# Patient Record
Sex: MALE | Race: OTHER | Employment: FULL TIME | URBAN - METROPOLITAN AREA
[De-identification: names, ages, dates, MRNs, and addresses within clinical notes are randomized per-mention and may not be internally consistent; named-entity substitution may affect disease eponyms.]

---

## 2019-03-28 ENCOUNTER — APPOINTMENT (OUTPATIENT)
Dept: CT IMAGING | Facility: HOSPITAL | Age: 49
DRG: 439 | End: 2019-03-28
Attending: EMERGENCY MEDICINE

## 2019-03-28 PROBLEM — E78.1 HYPERTRIGLYCERIDEMIA: Status: ACTIVE | Noted: 2019-03-28

## 2019-03-28 PROBLEM — F10.230 ALCOHOL WITHDRAWAL SYNDROME WITHOUT COMPLICATION (HCC): Status: ACTIVE | Noted: 2019-03-28

## 2019-03-28 PROBLEM — K85.20 ALCOHOL-INDUCED ACUTE PANCREATITIS, UNSPECIFIED COMPLICATION STATUS: Status: ACTIVE | Noted: 2019-03-28

## 2019-03-28 PROBLEM — E87.1 HYPONATREMIA: Status: ACTIVE | Noted: 2019-03-28

## 2019-03-28 PROBLEM — E83.42 HYPOMAGNESEMIA: Status: ACTIVE | Noted: 2019-03-28

## 2019-03-28 PROCEDURE — 74177 CT ABD & PELVIS W/CONTRAST: CPT | Performed by: EMERGENCY MEDICINE

## 2019-03-28 NOTE — ED NOTES
States pain started 2 days ago and came on real strong last night. States pain is all over his abdomen.

## 2019-03-28 NOTE — ED INITIAL ASSESSMENT (HPI)
Sent from [address] [place] [address] for eval of 3 days of lower abdominal pain with vomiting.  Took pepto with no relief    Dry mouth

## 2019-03-28 NOTE — ED PROVIDER NOTES
Patient Seen in: [facility] AND [facility] Emergency Department    History   Patient presents with:  Abdominal Pain    Stated Complaint: abd pain     HPI    52 yo M without PMH/PSH presenting for evaluation of two days of intermittent abdominal pain.  No nausea/vom Alert. Mild tremor noted. Skin: Skin is warm. Psychiatric: Cooperative. Nursing note and vitals reviewed.         ED Course     Labs Reviewed   URINALYSIS WITH CULTURE REFLEX - Abnormal; Notable for the following components:       Result Value    Protei DIFFERENTIAL WITH PLATELET    Narrative: The following orders were created for panel order CBC WITH DIFFERENTIAL WITH PLATELET.   Procedure                               Abnormality         Status                     --------- distal stomach and duodenum with surrounding inflammation. Segments of the colon are incompletely distended and suboptimally evaluated. A normal caliber appendix is seen without inflammatory manifestations.   URINARY BLADDER: No visible calculus or focal w MDM     DIFFERENTIAL DIAGNOSIS: After history and physical exam differential diagnosis includes but is not limited to alcohol withdrawal, alcoholic gastritis/pancreatitis/hepatitis, cirrhosis, electrolyte derangement.     Pulse ox: 98%:Normal on RA, as patient.

## 2019-03-29 ENCOUNTER — APPOINTMENT (OUTPATIENT)
Dept: ULTRASOUND IMAGING | Facility: HOSPITAL | Age: 49
DRG: 439 | End: 2019-03-29
Attending: INTERNAL MEDICINE

## 2019-03-29 PROCEDURE — 76705 ECHO EXAM OF ABDOMEN: CPT | Performed by: INTERNAL MEDICINE

## 2019-03-29 NOTE — H&P
[facility]    History and Physical    Idella Seats Patient Status:  Inpatient    1970 MRN [id number]   Location [facility] 2W/SW Attending Nataliia Rizo MD   [facility] Day # 1 PCP None Pcp     Date:  3/29/2019  Date of Admiss Regular rhythm. Tachycardia present. Pulmonary/Chest: Effort normal and breath sounds normal.   Abdominal: Soft. Bowel sounds are normal. There is tenderness in the epigastric area. Musculoskeletal: Normal range of motion.    Neurological: He is alert potential cystitis. 7. Lesser incidental findings as above.    Dictated by (CST): Rupal Simpson MD on 3/28/2019 at 21:21     Approved by (CST): Rupal Simpson MD on 3/28/2019 at 21:29                Assessment/Plan:     Alcohol withdrawal syndrome withou

## 2019-03-29 NOTE — BH PROGRESS NOTE
Behavioral Health Note  CHIEF COMPLAINT  Alcohol withdrawal  REASON FOR ADMISSION  Alcohol withdrawal    SOCIAL HISTORY  Kelly Valencia lives with his landlord and family. He does not smoke or use drugs and drinks a pint 3x weekly.    PAST PSYCHIATRIC HISTORY  Car

## 2019-03-29 NOTE — CONSULTS
[facility]    Report of Consultation    Yanely Dequanjoy Patient Status:  Inpatient    1970 MRN [id number]   Location [facility] 2W/SW Attending Hernando Mccrary MD   [facility] Day # 1 PCP None Pcp     Date of Admission:  3/28/201 packet, 1 packet, Oral, TID CC  •  dextrose 10 % infusion, , Intravenous, Continuous  •  calcium gluconate 2 g in sodium chloride 0.9% 100 mL IVPB, 2 g, Intravenous, Once  •  dextrose 5 %-0.45 % NaCl infusion, 100 mL/hr, Intravenous, Continuous PRN  •  Ins

## 2019-03-29 NOTE — ED NOTES
Lab waiting for bmp and hfp to be entirely completed before resulting, verbal results for BUN  - 16, Cre 1.24 per [employer] tech [address] [facility]

## 2019-03-29 NOTE — CONSULTS
[facility]    Report of Consultation    Nicho Gertrude Patient Status:  Inpatient    1970 MRN [id number]   Location [facility] 2W/SW Attending Terry Parker MD   [address] Day # 1 PCP None Pcp     Date of Admission:  3/28/2019 LORazepam (ATIVAN) injection 3 mg 3 mg Intravenous Q15 Min PRN   LORazepam (ATIVAN) injection 4 mg 4 mg Intravenous Q10 Min PRN   ondansetron HCl (ZOFRAN) injection 4 mg 4 mg Intravenous Q6H PRN   ibuprofen (MOTRIN) tab 600 mg 600 mg Oral Q6H PRN   Insul Value Date    WBC 8.3 03/28/2019    HGB 14.8 03/28/2019    HCT 40.9 03/28/2019    .0 (L) 03/28/2019    CREATSERUM 1.24 03/28/2019    BUN 16 03/28/2019     (L) 03/28/2019    K 3.0 (L) 03/28/2019    CL 86 (L) 03/28/2019    CO2 24.0 03/28/2019 bowel obstruction. Fairly extensive wall thickening involving distal stomach and duodenum with surrounding inflammation. Segments of the colon are incompletely distended and suboptimally evaluated.  A normal caliber appendix is seen without inflammatory m Approved by (CST): Rupal Simpson MD on 3/28/2019 at 21:29             Impression:   Mr Victor M Forrest is a 52 y/o male with hx of ETOH abuse that was admitted with pancreatitis.  Noted markedly elevated TG as well (3950) Mild lactic acidosis resolved otherwise n

## 2019-03-29 NOTE — PLAN OF CARE
Patient Centered Care    • Patient preferences are identified and integrated in the patient's plan of care Progressing        Patient/Family Goals    • Patient/Family Long Term Goal Progressing    • Patient/Family Short Term Goal Progressing            Pt

## 2019-03-30 NOTE — PROGRESS NOTES
[facility] AND [facility]  Gastroenterology Progress Note    Jessica Evans Patient Status:  Inpatient    1970 MRN [id number]   Location [facility] 2W/SW Attending Asia Luna MD   [facility] Day # 2 PCP None Pcp     Subjective:  Jessica Evans is a( likely reactive to pancreatitis. 4. Severe fatty liver. 5. Mild T12 vertebral body compression deformity with nonacute imaging features. 6. Circumferential urinary bladder wall thickening. Please correlate with urinalysis for potential cystitis.  7. Lesser

## 2019-03-30 NOTE — PLAN OF CARE
DISCHARGE PLANNING    • Discharge to home or other facility with appropriate resources Progressing        METABOLIC/FLUID AND ELECTROLYTES - ADULT    • Glucose maintained within prescribed range Progressing    • Electrolytes maintained within normal limits

## 2019-03-30 NOTE — PROGRESS NOTES
[facility]    Progress Note    Yanely Matthews Patient Status:  Inpatient    1970 MRN [id number]   Location [facility] 2W/SW Attending Hernando Mccrary MD   Hosp Day # 2 PCP None Pcp     Subjective:  Feels better  Is hungry

## 2019-03-30 NOTE — PROGRESS NOTES
[facility]    Progress Note    Sasha Sanderson Patient Status:  Inpatient    1970 MRN [id number]   Location [facility] 2W/SW Attending Lois Yen MD   [facility] Day # 2 PCP None Pcp        Subjective:     Constitutional: Ne has no decreased breath sounds. He has no wheezes. He has no rhonchi. He has no rales. He exhibits no tenderness. Abdominal: Soft. Bowel sounds are normal. He exhibits no distension. There is tenderness in the epigastric area.  There is no rigidity, no re interstitial edematous pancreatitis including inflammatory stranding and free fluid surrounding the pancreas. No definite single-phase CT findings to suggest pancreatic necrosis. No pancreatic ductal dilation.  2. Wall thickening and inflammatory stranding

## 2019-03-30 NOTE — PLAN OF CARE
-Patient is on insulin drip and IV dextrose 10%. Infusions managed by endocrinology. Accucheck Q1H; goal for nursing staff to maintain blood glucose 100-150 mg/dl in regards to titration of insulin drip.    -Skin remains intact.  Pain managed inadequately,

## 2019-03-31 NOTE — PROGRESS NOTES
[facility]    Progress Note    Yanely Matthews Patient Status:  Inpatient    1970 MRN [id number]   Location [facility] 5SW/SE Attending Hernando Mccrary MD   Hosp Day # 3 PCP None Pcp        Subjective:     Constitutional: N accessory muscle usage or stridor. No apnea, no tachypnea and no bradypnea. He is not intubated. No respiratory distress. He has no decreased breath sounds. He has no wheezes. He has no rhonchi. He has no rales. He exhibits no tenderness.    Abdominal: Mono (H) 03/29/2019     (H) 03/31/2019    MG 1.8 03/31/2019    PHOS 2.1 (L) 03/30/2019    ETOH <3 03/28/2019                        Nayeli Barone MD  3/31/2019

## 2019-03-31 NOTE — PROGRESS NOTES
Report given to Mildred YATES patient will transfer to [phone] via remote tele. Patient notified and he will notify family of room change.

## 2019-03-31 NOTE — PROGRESS NOTES
[facility]    Progress Note    Cullen Whitfield Patient Status:  Inpatient    1970 MRN [id number]   Location [facility] 2W/SW Attending Lyudmila Del Real MD   Hosp Day # 3 PCP None Pcp     Subjective:  Feels better  Is hungry

## 2019-03-31 NOTE — CONSULTS
[facility]    Report of Consultation    Yanely Matthews Patient Status:  Inpatient    1970 MRN [id number]   Location [facility] 2W/SW Attending Hernando Mccrary MD   [facility] Day # 2 PCP None Pcp     Date of Admission:  3/28/201 on.  Patient denying any major psychosocial problem reporting that he lives with his landlord and his family. Patient denies smoking or using any illicit drugs.     The patient admitted going to rehab in the past to be able to establish sobriety but report Allergies      Review of Systems:     As by Admitting/Attending    Results:     Laboratory Data:  Lab Results   Component Value Date    WBC 5.7 03/30/2019    HGB 12.5 (L) 03/30/2019    HCT 35.2 (L) 03/30/2019    PLT 86.0 (L) 03/30/2019    CREATSERUM 0.73 0 temperature 98.2 °F (36.8 °C), temperature source Temporal, resp. rate 21, height 62\", weight 130 lb 1.6 oz, SpO2 100 %. Mental Status Exam:     The patient is alert and oriented x3. Overweight male in hospital gown laying down in bed.   The patient pr Ethyl Alcohol      Scan slide      RBC Morphology Scan      Triglycerides      Lactic Acid, Plasma      Comp Metabolic Panel (14)      Lipase      CBC With Differential With Platelet      Triglycerides      LACTIC ACID RFLX DO NOT CANCEL      Magnesium

## 2019-03-31 NOTE — PROGRESS NOTES
[facility] AND [facility]  Gastroenterology Progress Note    Kristen Wong Patient Status:  Inpatient    1970 MRN [id number]   Location [facility] 5SW/SE Attending Sara Dorsey MD   [facility] Day # 3 PCP None Pcp     Subjective:  Kristen Wong is a hepatitis. Will plan to start when LFT's improved. LFT\"s are downtrending save bilirubin up to 3.9 today. Will follow.  U/s and CT not suggestive of obstructive process  -Normal INR, DF <32  -Monitor lytes  -Trend LFT's  -Full liquids today    Sofiya Drilling

## 2019-03-31 NOTE — PLAN OF CARE
Some c/o abd pain which Motrin has been working well for. Tolerating Clear Liquids with no c/o. Does not attempt to get oob without assist and uses the call light appropriately. No c/o anxiety & no s&s of withdrawal up to this note's timing.  Resting in bed

## 2019-04-01 NOTE — DISCHARGE SUMMARY
[place] [facility]    Discharge Summary    Nicho Loredo Patient Status:  Inpatient    1970 MRN [id number]   Location [facility] 5SW/SE Attending Terry Parker MD   Hosp Day # 4 PCP None Pcp     Date of Admission: 3/28/2019   Efrain HENT:   Head: Normocephalic. Eyes: Pupils are equal, round, and reactive to light. Neck: Normal range of motion. Cardiovascular: Normal rate and regular rhythm. Pulmonary/Chest: Effort normal and breath sounds normal.   Abdominal: Soft.  Bowel so

## 2019-04-01 NOTE — PLAN OF CARE
Problem: Patient Centered Care  Goal: Patient preferences are identified and integrated in the patient's plan of care  Interventions:  - What would you like us to know as we care for you?  Pt from home  - Provide timely, complete, and accurate information t prescribed range  INTERVENTIONS:  - Monitor Blood Glucose as ordered  - Assess for signs and symptoms of hyperglycemia and hypoglycemia  - Administer ordered medications to maintain glucose within target range  - Assess barriers to adequate nutritional int

## 2019-04-01 NOTE — PROGRESS NOTES
[facility] AND [facility]  Gastroenterology Progress Note    Asad Sullivan Patient Status:  Inpatient    1970 MRN [id number]   Location [facility] 5SW/SE Attending Jessenia Cooper MD   [address] Day # 4 PCP None Pcp     Subjective:  Asad Sullivan is a FACG  4/1/2019  4:12 PM

## 2019-07-23 NOTE — PROGRESS NOTES
Double RN skin check done prior to transfer off Unit. Skin check performed by this RN and Alice AGUILAR    Wounds are as follows: NONE    Will remain available for any further questions or concerns. PAST SURGICAL HISTORY:  History of hernia repair

## 2023-02-22 ENCOUNTER — APPOINTMENT (RX ONLY)
Dept: URBAN - METROPOLITAN AREA CLINIC 110 | Facility: CLINIC | Age: 53
Setting detail: DERMATOLOGY
End: 2023-02-22

## 2023-02-22 VITALS — HEIGHT: 62 IN | WEIGHT: 140 LBS

## 2023-02-22 DIAGNOSIS — R21 RASH AND OTHER NONSPECIFIC SKIN ERUPTION: ICD-10-CM

## 2023-02-22 PROCEDURE — ? DEFER

## 2023-02-22 PROCEDURE — ? COUNSELING

## 2023-02-22 PROCEDURE — ? PRESCRIPTION

## 2023-02-22 PROCEDURE — 99203 OFFICE O/P NEW LOW 30 MIN: CPT

## 2023-02-22 RX ORDER — KETOCONAZOLE 20 MG/ML
SHAMPOO, SUSPENSION TOPICAL
Qty: 120 | Refills: 0 | Status: ERX | COMMUNITY
Start: 2023-02-22

## 2023-02-22 RX ORDER — TERBINAFINE HYDROCHLORIDE 250 MG/1
TABLET ORAL
Qty: 30 | Refills: 0 | Status: ERX | COMMUNITY
Start: 2023-02-22

## 2023-02-22 RX ADMIN — TERBINAFINE HYDROCHLORIDE: 250 TABLET ORAL at 00:00

## 2023-02-22 RX ADMIN — KETOCONAZOLE: 20 SHAMPOO, SUSPENSION TOPICAL at 00:00

## 2023-02-22 ASSESSMENT — LOCATION DETAILED DESCRIPTION DERM: LOCATION DETAILED: LEFT CLAVICULAR SKIN

## 2023-02-22 ASSESSMENT — LOCATION ZONE DERM: LOCATION ZONE: TRUNK

## 2023-02-22 ASSESSMENT — LOCATION SIMPLE DESCRIPTION DERM: LOCATION SIMPLE: LEFT CLAVICULAR SKIN

## 2023-02-22 NOTE — PROCEDURE: DEFER
Size Of Lesion In Cm (Optional): 0
Introduction Text (Please End With A Colon): The following procedure was deferred:
Procedure To Be Performed At Next Visit: Other
Other Procedure: Order liver function test
Detail Level: Detailed

## 2023-03-15 ENCOUNTER — APPOINTMENT (RX ONLY)
Dept: URBAN - METROPOLITAN AREA CLINIC 110 | Facility: CLINIC | Age: 53
Setting detail: DERMATOLOGY
End: 2023-03-15

## 2023-03-15 VITALS — HEIGHT: 60 IN | WEIGHT: 140 LBS

## 2023-03-15 DIAGNOSIS — Z71.89 OTHER SPECIFIED COUNSELING: ICD-10-CM

## 2023-03-15 DIAGNOSIS — L82.1 OTHER SEBORRHEIC KERATOSIS: ICD-10-CM

## 2023-03-15 DIAGNOSIS — L57.8 OTHER SKIN CHANGES DUE TO CHRONIC EXPOSURE TO NONIONIZING RADIATION: ICD-10-CM

## 2023-03-15 DIAGNOSIS — D22 MELANOCYTIC NEVI: ICD-10-CM

## 2023-03-15 DIAGNOSIS — L81.4 OTHER MELANIN HYPERPIGMENTATION: ICD-10-CM

## 2023-03-15 DIAGNOSIS — B36.0 PITYRIASIS VERSICOLOR: ICD-10-CM

## 2023-03-15 DIAGNOSIS — D18.0 HEMANGIOMA: ICD-10-CM

## 2023-03-15 PROBLEM — D18.01 HEMANGIOMA OF SKIN AND SUBCUTANEOUS TISSUE: Status: ACTIVE | Noted: 2023-03-15

## 2023-03-15 PROBLEM — D22.5 MELANOCYTIC NEVI OF TRUNK: Status: ACTIVE | Noted: 2023-03-15

## 2023-03-15 PROCEDURE — ? PRESCRIPTION

## 2023-03-15 PROCEDURE — ? COUNSELING

## 2023-03-15 PROCEDURE — ? DEFER

## 2023-03-15 PROCEDURE — 99213 OFFICE O/P EST LOW 20 MIN: CPT

## 2023-03-15 RX ORDER — KETOCONAZOLE 20 MG/ML
SHAMPOO, SUSPENSION TOPICAL
Qty: 120 | Refills: 0 | Status: ERX

## 2023-03-15 RX ORDER — TERBINAFINE HYDROCHLORIDE 250 MG/1
TABLET ORAL
Qty: 14 | Refills: 0 | Status: ERX

## 2023-03-15 ASSESSMENT — LOCATION SIMPLE DESCRIPTION DERM
LOCATION SIMPLE: LEFT UPPER BACK
LOCATION SIMPLE: CHEST
LOCATION SIMPLE: LEFT CLAVICULAR SKIN
LOCATION SIMPLE: RIGHT UPPER BACK

## 2023-03-15 ASSESSMENT — LOCATION ZONE DERM: LOCATION ZONE: TRUNK

## 2023-03-15 ASSESSMENT — LOCATION DETAILED DESCRIPTION DERM
LOCATION DETAILED: RIGHT INFERIOR UPPER BACK
LOCATION DETAILED: LEFT MEDIAL UPPER BACK
LOCATION DETAILED: STERNUM
LOCATION DETAILED: LEFT SUPERIOR MEDIAL UPPER BACK
LOCATION DETAILED: RIGHT MID-UPPER BACK
LOCATION DETAILED: LEFT CLAVICULAR SKIN
LOCATION DETAILED: LEFT INFERIOR LATERAL UPPER BACK

## 2024-05-29 ENCOUNTER — APPOINTMENT (RX ONLY)
Dept: URBAN - METROPOLITAN AREA CLINIC 110 | Facility: CLINIC | Age: 54
Setting detail: DERMATOLOGY
End: 2024-05-29

## 2024-05-29 VITALS — WEIGHT: 160 LBS | HEIGHT: 61 IN

## 2024-05-29 VITALS — HEIGHT: 61 IN | WEIGHT: 160 LBS

## 2024-05-29 DIAGNOSIS — L71.8 OTHER ROSACEA: ICD-10-CM

## 2024-05-29 PROCEDURE — 99213 OFFICE O/P EST LOW 20 MIN: CPT

## 2024-05-29 PROCEDURE — ? PRESCRIPTION

## 2024-05-29 PROCEDURE — ? COUNSELING

## 2024-05-29 RX ORDER — HYDROCORTISONE 25 MG/G
CREAM TOPICAL
Qty: 30 | Refills: 1 | Status: ERX | COMMUNITY
Start: 2024-05-29

## 2024-05-29 RX ADMIN — HYDROCORTISONE: 25 CREAM TOPICAL at 00:00

## 2024-05-29 ASSESSMENT — LOCATION ZONE DERM: LOCATION ZONE: FACE

## 2024-05-29 ASSESSMENT — LOCATION DETAILED DESCRIPTION DERM
LOCATION DETAILED: RIGHT CENTRAL MALAR CHEEK
LOCATION DETAILED: LEFT CENTRAL MALAR CHEEK

## 2024-05-29 ASSESSMENT — LOCATION SIMPLE DESCRIPTION DERM
LOCATION SIMPLE: RIGHT CHEEK
LOCATION SIMPLE: LEFT CHEEK

## 2024-08-07 ENCOUNTER — APPOINTMENT (RX ONLY)
Dept: URBAN - METROPOLITAN AREA CLINIC 110 | Facility: CLINIC | Age: 54
Setting detail: DERMATOLOGY
End: 2024-08-07

## 2024-08-07 DIAGNOSIS — B36.0 PITYRIASIS VERSICOLOR: ICD-10-CM | Status: INADEQUATELY CONTROLLED

## 2024-08-07 DIAGNOSIS — L71.8 OTHER ROSACEA: ICD-10-CM | Status: WELL CONTROLLED

## 2024-08-07 PROCEDURE — ? TREATMENT REGIMEN

## 2024-08-07 PROCEDURE — ? COUNSELING

## 2024-08-07 PROCEDURE — ? PRESCRIPTION

## 2024-08-07 PROCEDURE — 99213 OFFICE O/P EST LOW 20 MIN: CPT

## 2024-08-07 RX ORDER — CICLOPIROX OLAMINE 7.7 MG/100ML
SUSPENSION TOPICAL
Qty: 60 | Refills: 6 | Status: ERX | COMMUNITY
Start: 2024-08-07

## 2024-08-07 RX ORDER — KETOCONAZOLE 20 MG/ML
SHAMPOO, SUSPENSION TOPICAL
Qty: 120 | Refills: 10 | Status: ERX

## 2024-08-07 RX ADMIN — CICLOPIROX OLAMINE: 7.7 SUSPENSION TOPICAL at 00:00

## 2024-08-07 ASSESSMENT — LOCATION DETAILED DESCRIPTION DERM
LOCATION DETAILED: LEFT CENTRAL MALAR CHEEK
LOCATION DETAILED: RIGHT CENTRAL MALAR CHEEK

## 2024-08-07 ASSESSMENT — LOCATION SIMPLE DESCRIPTION DERM
LOCATION SIMPLE: LEFT CHEEK
LOCATION SIMPLE: RIGHT CHEEK

## 2024-08-07 ASSESSMENT — LOCATION ZONE DERM: LOCATION ZONE: FACE

## 2024-08-07 NOTE — PROCEDURE: TREATMENT REGIMEN
Detail Level: Zone
Initiate Treatment: ketoconazole 2 % shampoo \\nciclopirox 0.77 % topical suspension
Initiate Treatment: Azelaic Acid15%/Metronidazole1%/Ivermectin1% cream